# Patient Record
Sex: FEMALE | Race: WHITE | NOT HISPANIC OR LATINO | Employment: UNEMPLOYED | URBAN - METROPOLITAN AREA
[De-identification: names, ages, dates, MRNs, and addresses within clinical notes are randomized per-mention and may not be internally consistent; named-entity substitution may affect disease eponyms.]

---

## 2017-08-23 ENCOUNTER — HOSPITAL ENCOUNTER (OUTPATIENT)
Dept: LAB | Facility: HOSPITAL | Age: 47
Setting detail: SPECIMEN
Discharge: HOME | End: 2017-08-23
Attending: INTERNAL MEDICINE | Admitting: INTERNAL MEDICINE

## 2017-08-23 LAB
T4 FREE SERPL-MCNC: 1.25 NG/DL (ref 0.58–1.64)
THYROGLOB AB SERPL-ACNC: <1 [IU]/ML (ref 0–4)
TSH SERPL-ACNC: 0.72 UIU/ML (ref 0.34–5.6)

## 2017-08-30 ENCOUNTER — CONVERSION ENCOUNTER (OUTPATIENT)
Dept: ENDOCRINOLOGY | Facility: CLINIC | Age: 47
End: 2017-08-30

## 2018-08-22 ENCOUNTER — HOSPITAL ENCOUNTER (OUTPATIENT)
Dept: LAB | Facility: HOSPITAL | Age: 48
Setting detail: SPECIMEN
Discharge: HOME | End: 2018-08-22
Attending: INTERNAL MEDICINE | Admitting: INTERNAL MEDICINE

## 2018-08-29 ENCOUNTER — CONVERSION ENCOUNTER (OUTPATIENT)
Dept: ENDOCRINOLOGY | Facility: CLINIC | Age: 48
End: 2018-08-29

## 2018-11-27 ENCOUNTER — HOSPITAL ENCOUNTER (OUTPATIENT)
Dept: LAB | Facility: HOSPITAL | Age: 48
Setting detail: SPECIMEN
Discharge: HOME | End: 2018-11-27
Attending: INTERNAL MEDICINE | Admitting: INTERNAL MEDICINE

## 2019-06-04 VITALS
BODY MASS INDEX: 21.44 KG/M2 | DIASTOLIC BLOOD PRESSURE: 68 MMHG | WEIGHT: 121 LBS | HEIGHT: 63 IN | SYSTOLIC BLOOD PRESSURE: 120 MMHG | WEIGHT: 121 LBS | DIASTOLIC BLOOD PRESSURE: 82 MMHG | OXYGEN SATURATION: 100 % | HEART RATE: 69 BPM | SYSTOLIC BLOOD PRESSURE: 104 MMHG | HEART RATE: 75 BPM | OXYGEN SATURATION: 99 %

## 2019-08-23 DIAGNOSIS — E89.0 POSTOPERATIVE HYPOTHYROIDISM: ICD-10-CM

## 2019-08-23 DIAGNOSIS — C73 CARCINOMA OF THYROID GLAND (HCC): Primary | ICD-10-CM

## 2019-08-27 ENCOUNTER — LAB (OUTPATIENT)
Dept: LAB | Facility: HOSPITAL | Age: 49
End: 2019-08-27

## 2019-08-27 DIAGNOSIS — E89.0 POSTOPERATIVE HYPOTHYROIDISM: ICD-10-CM

## 2019-08-27 DIAGNOSIS — C73 CARCINOMA OF THYROID GLAND (HCC): ICD-10-CM

## 2019-08-27 LAB
T4 FREE SERPL-MCNC: 1.82 NG/DL (ref 0.58–1.64)
TSH SERPL DL<=0.05 MIU/L-ACNC: 0.03 MIU/ML (ref 0.34–5.6)

## 2019-08-27 PROCEDURE — 84432 ASSAY OF THYROGLOBULIN: CPT | Performed by: INTERNAL MEDICINE

## 2019-08-27 PROCEDURE — 84443 ASSAY THYROID STIM HORMONE: CPT | Performed by: INTERNAL MEDICINE

## 2019-08-27 PROCEDURE — 36415 COLL VENOUS BLD VENIPUNCTURE: CPT

## 2019-08-27 PROCEDURE — 84439 ASSAY OF FREE THYROXINE: CPT | Performed by: INTERNAL MEDICINE

## 2019-08-30 LAB
THYROGLOB AB SERPL-ACNC: <1 IU/ML (ref 1–4)
THYROGLOB AG TISS QL IMSTN: 14 NG/ML (ref 0–33)

## 2019-09-03 ENCOUNTER — OFFICE VISIT (OUTPATIENT)
Dept: ENDOCRINOLOGY | Facility: CLINIC | Age: 49
End: 2019-09-03

## 2019-09-03 VITALS
WEIGHT: 132 LBS | BODY MASS INDEX: 23.39 KG/M2 | DIASTOLIC BLOOD PRESSURE: 100 MMHG | OXYGEN SATURATION: 98 % | HEIGHT: 63 IN | SYSTOLIC BLOOD PRESSURE: 158 MMHG | HEART RATE: 78 BPM

## 2019-09-03 DIAGNOSIS — E89.0 POSTOPERATIVE HYPOTHYROIDISM: Primary | ICD-10-CM

## 2019-09-03 PROCEDURE — 99212 OFFICE O/P EST SF 10 MIN: CPT | Performed by: INTERNAL MEDICINE

## 2019-09-03 RX ORDER — ASPIRIN 325 MG
TABLET ORAL DAILY
COMMUNITY
Start: 2015-08-19

## 2019-09-03 RX ORDER — LEVOTHYROXINE SODIUM 0.12 MG/1
125 TABLET ORAL DAILY
Refills: 0 | COMMUNITY
Start: 2019-08-08 | End: 2019-10-02 | Stop reason: SDUPTHER

## 2019-09-03 RX ORDER — PANTOPRAZOLE SODIUM 40 MG/1
TABLET, DELAYED RELEASE ORAL
COMMUNITY
Start: 2019-07-18

## 2019-09-03 NOTE — PATIENT INSTRUCTIONS
Decrease salt intake.  Increase exercise.  Decrease levothyroxine to one daily.  Recheck BP once a week. Goal 130/80 or lower.  F/u in 1y with labs prior.

## 2019-09-09 NOTE — PROGRESS NOTES
[facility]        Patient Care Team:  Provider, Flor Known as PCP - General    Chief Complaint:    Chief Complaint   Patient presents with   • Thyroid Problem         Subjective     Here for thyroid f/u  Taking levothyroxine one tab 6d/ week, 2 tabs one day/ week as directed last visit  Exercise program: not much  Does not limit salt intake    Interval History:     Patient Complaints: stressed with family health issues  Patient Denies:  Palpitations   History taken from: patient    Review of Systems:   Review of Systems   Eyes: Negative for blurred vision.   Gastrointestinal: Negative for nausea.   Endocrine: Negative for polyuria.   Neurological: Negative for headache.     Gained 12 lb since last visit  Objective     Vital Signs      Vitals:    09/03/19 0806   BP: 158/100   Pulse: 78   SpO2: 98%         Physical Exam:     General Appearance:    Alert, cooperative, in no acute distress, obese   Head:    Normocephalic, without obvious abnormality, atraumatic   Eyes:       Mouth:  Neck:       No periorbital edema. No lid lag    No lesions    Scar from thyroidectomy, no masses or nodules   Lungs:     Clear / decreased breath sounds    Heart:    Regular rhythm and normal rate   Abdomen:     Normal bowel sounds, soft                 Extremities:   Moves all extremities well, no edema. Fine hand tremors               Pulses:   Pulses palpable and equal bilaterally   Skin:   No rash   Neurologic:  DTR 1+          Results Review:    I have reviewed the patient's new clinical results, labs & imaging.    Medication Review:   Prior to Admission medications    Medication Sig Start Date End Date Taking? Authorizing Provider   Calcium Carb-Cholecalciferol (CALCIUM-VITAMIN D3) 600-500 MG-UNIT capsule Take one daily 5/20/15  Yes Jaimie Joel MD   levothyroxine (SYNTHROID, LEVOTHROID) 125 MCG tablet Take 125 mcg by mouth Daily. Take one daily 8/8/19  Yes Jaimie Joel MD   Multiple Vitamins-Iron  (ONE DAILY MULTIVITAMIN/IRON) tablet ONE DAILY MULTIVITAMIN/IRON TABS 8/19/15  Yes Provider, MD Jaimie   pantoprazole (PROTONIX) 40 MG EC tablet Take one daily 7/18/19  Yes Provider, MD Jaimie       Lab Results (most recent)     None            No results found for: HGBA1C   No results found for: GLUCOSE, BUN, CREATININE, EGFRIFNONA, EGFRIFAFRI, BCR, K, CO2, CALCIUM, PROTENTOTREF, ALBUMIN, LABIL2, AST, ALT, CHOL, LDL, HDL, TRIG  Lab Results   Component Value Date    TSH 0.030 (L) 08/27/2019    FREET4 1.82 (H) 08/27/2019     Thyroglobulin 14    Assessment/Plan     Ellie was seen today for thyroid problem.    Diagnoses and all orders for this visit:    Postoperative hypothyroidism  -     TSH; Future  -     T4, Free; Future        Decrease salt intake.  Increase exercise.  Decrease levothyroxine to one tab daily.  Recheck BP once a week. Goal 130/80 or lower.        Fang Acevedo MD  09/08/19  10:08 PM

## 2019-10-02 RX ORDER — LEVOTHYROXINE SODIUM 0.12 MG/1
TABLET ORAL
Qty: 90 TABLET | Refills: 6 | Status: SHIPPED | OUTPATIENT
Start: 2019-10-02 | End: 2020-09-15

## 2020-09-04 DIAGNOSIS — E89.0 POSTOPERATIVE HYPOTHYROIDISM: Primary | ICD-10-CM

## 2020-09-08 ENCOUNTER — LAB (OUTPATIENT)
Dept: LAB | Facility: HOSPITAL | Age: 50
End: 2020-09-08

## 2020-09-08 DIAGNOSIS — E89.0 POSTOPERATIVE HYPOTHYROIDISM: ICD-10-CM

## 2020-09-08 LAB
T4 FREE SERPL-MCNC: 1.92 NG/DL (ref 0.93–1.7)
TSH SERPL DL<=0.05 MIU/L-ACNC: 0.15 UIU/ML (ref 0.27–4.2)

## 2020-09-08 PROCEDURE — 36415 COLL VENOUS BLD VENIPUNCTURE: CPT

## 2020-09-08 PROCEDURE — 84443 ASSAY THYROID STIM HORMONE: CPT

## 2020-09-08 PROCEDURE — 84439 ASSAY OF FREE THYROXINE: CPT

## 2020-09-15 ENCOUNTER — TELEMEDICINE (OUTPATIENT)
Dept: ENDOCRINOLOGY | Facility: CLINIC | Age: 50
End: 2020-09-15

## 2020-09-15 VITALS
HEART RATE: 77 BPM | WEIGHT: 130.6 LBS | SYSTOLIC BLOOD PRESSURE: 119 MMHG | TEMPERATURE: 98.9 F | HEIGHT: 63 IN | BODY MASS INDEX: 23.14 KG/M2 | DIASTOLIC BLOOD PRESSURE: 69 MMHG

## 2020-09-15 DIAGNOSIS — E89.0 POSTOPERATIVE HYPOTHYROIDISM: Primary | ICD-10-CM

## 2020-09-15 DIAGNOSIS — C73 CARCINOMA OF THYROID GLAND (HCC): ICD-10-CM

## 2020-09-15 PROCEDURE — 99213 OFFICE O/P EST LOW 20 MIN: CPT | Performed by: INTERNAL MEDICINE

## 2020-09-15 RX ORDER — AMLODIPINE BESYLATE 5 MG/1
5 TABLET ORAL DAILY
COMMUNITY

## 2020-09-15 RX ORDER — LEVOTHYROXINE SODIUM 0.12 MG/1
TABLET ORAL
Qty: 90 TABLET | Refills: 3 | Status: SHIPPED | OUTPATIENT
Start: 2020-09-15 | End: 2021-09-11 | Stop reason: SDUPTHER

## 2020-09-15 NOTE — PATIENT INSTRUCTIONS
Continue exercise.  Decrease levothyroxine to one tab 6 d / week.  F/u in 1y, with labs & thyroid U/S prior.

## 2020-09-26 NOTE — PROGRESS NOTES
[facility]        Patient Care Team:  ProviderFlor Known as PCP - General    Chief Complaint:    Chief Complaint   Patient presents with   • Hypothyroidism         Subjective     Here for thyroid f/u  This pt consented to this telemedicine visit in view of the covid 19 pandemic  Taking levothyroxine regularly  Exercise program: elliptical machine 30 min daily, walking    Interval History:     Patient Complaints: none  Patient Denies:  Palpitations   History taken from: patient    Review of Systems:   Review of Systems   HENT: Negative for trouble swallowing.    Eyes: Negative for blurred vision.   Respiratory: Negative for shortness of breath.    Cardiovascular: Negative for palpitations.   Gastrointestinal: Negative for nausea.   Endocrine: Negative for polyuria.   Neurological: Negative for tremors and headache.     Gained 2 lb since last visit  Objective     Vital Signs      Vitals:    09/15/20 0824   BP: 119/69   Pulse: 77   Temp: 98.9 °F (37.2 °C)         Physical Exam: limited due to video visit     General Appearance:    Alert, cooperative, in no acute distress, obese   Head:    Normocephalic, no periorbital edema or lid lag        Results Review:    I have reviewed the patient's new clinical results, labs & imaging.    Medication Review:   Prior to Admission medications    Medication Sig Start Date End Date Taking? Authorizing Provider   amLODIPine (Norvasc) 5 MG tablet Take 5 mg by mouth Daily.   Yes Jaimie Joel MD   Calcium Carb-Cholecalciferol (CALCIUM-VITAMIN D3) 600-500 MG-UNIT capsule Take one daily 5/20/15  Yes Jaimie Joel MD   levothyroxine (SYNTHROID, LEVOTHROID) 125 MCG tablet Take one tab 6 d / week. 9/15/20  Yes Fang Acevedo MD   Multiple Vitamins-Iron (ONE DAILY MULTIVITAMIN/IRON) tablet Daily. 8/19/15  Yes Jaimie Joel MD   pantoprazole (PROTONIX) 40 MG EC tablet Take one daily 7/18/19  Yes Jaimie Joel MD       Lab Results  (most recent)     None          Lab Results   Component Value Date    TSH 0.149 (L) 09/08/2020    FREET4 1.92 (H) 09/08/2020       Assessment/Plan     Ellie was seen today for hypothyroidism.    Diagnoses and all orders for this visit:    Postoperative hypothyroidism  -     levothyroxine (SYNTHROID, LEVOTHROID) 125 MCG tablet; Take one tab 6 d / week.  -     US Thyroid; Future  -     TSH; Future  -     T4, Free; Future  -     Comprehensive Thyroglobulin; Future    Carcinoma of thyroid gland (CMS/HCC)  -     US Thyroid; Future    Thyroid over treated. Will check thyroid cancer status next visit.    Continue exercise.  Decrease levothyroxine to one tab 6 d / week.    Spent 15 min talking to pt.    Fang Acevedo MD  09/25/20  23:29 EDT

## 2021-01-25 ENCOUNTER — TELEPHONE (OUTPATIENT)
Dept: ENDOCRINOLOGY | Facility: CLINIC | Age: 51
End: 2021-01-25

## 2021-08-30 ENCOUNTER — TELEPHONE (OUTPATIENT)
Dept: ENDOCRINOLOGY | Facility: CLINIC | Age: 51
End: 2021-08-30

## 2021-08-30 NOTE — TELEPHONE ENCOUNTER
TRIED TO CALL PATIENT REGARDING LAB BEING CLOSED FOR LAB APPT Wednesday 9/1/2021. NO ANSWER AND VM IS FULL.

## 2021-09-02 RX ORDER — PREDNISONE 20 MG/1
40 TABLET ORAL DAILY
COMMUNITY
Start: 2021-07-28 | End: 2021-09-10

## 2021-09-08 ENCOUNTER — TELEMEDICINE (OUTPATIENT)
Dept: ENDOCRINOLOGY | Facility: CLINIC | Age: 51
End: 2021-09-08

## 2021-09-08 VITALS — HEIGHT: 63 IN | BODY MASS INDEX: 22.15 KG/M2 | WEIGHT: 125 LBS

## 2021-09-08 DIAGNOSIS — C73 CARCINOMA OF THYROID GLAND (HCC): ICD-10-CM

## 2021-09-08 DIAGNOSIS — E89.0 POSTOPERATIVE HYPOTHYROIDISM: Primary | ICD-10-CM

## 2021-09-08 PROCEDURE — 99214 OFFICE O/P EST MOD 30 MIN: CPT | Performed by: INTERNAL MEDICINE

## 2021-09-08 NOTE — PATIENT INSTRUCTIONS
Will call you with the lab results.  Continue exercise.  Continue levothyroxine.  F/u in 1y, with labs prior.

## 2021-09-11 DIAGNOSIS — C73 CARCINOMA OF THYROID GLAND (HCC): Primary | ICD-10-CM

## 2021-09-11 DIAGNOSIS — E89.0 POSTOPERATIVE HYPOTHYROIDISM: ICD-10-CM

## 2021-09-11 RX ORDER — LEVOTHYROXINE SODIUM 0.12 MG/1
TABLET ORAL
Qty: 90 TABLET | Refills: 3 | Status: SHIPPED | OUTPATIENT
Start: 2021-09-11 | End: 2021-12-13 | Stop reason: SDUPTHER

## 2021-09-11 NOTE — PROGRESS NOTES
[facility]        Patient Care Team:  Flor Joel Known as PCP - General    Chief Complaint:    Chief Complaint   Patient presents with   • Hypothyroidism     follow up         Subjective     Here for thyroid f/u  This pt consented to this telemedicine visit in view of the covid 19 pandemic  Taking levothyroxine one tab 6d/week as directed  Exercise program: walking, elliptical machine    Interval History:     Patient Complaints: none  Patient Denies:  Palpitations  History taken from: patient    Review of Systems:   Review of Systems   Eyes: Negative for blurred vision.   Gastrointestinal: Negative for nausea.   Endocrine: Negative for polyuria.   Neurological: Negative for headache.     Lost  5 lb since last visit  Objective     Vital Signs    There were no vitals filed for this visit.      Physical Exam: limited due to video visit     General Appearance:    Alert, cooperative, in no acute distress, obese   Head:    Normocephalic, no periorbital edema        Results Review:    I have reviewed the patient's new clinical results, labs & imaging.    Medication Review:   Prior to Admission medications    Medication Sig Start Date End Date Taking? Authorizing Provider   amLODIPine (Norvasc) 5 MG tablet Take 5 mg by mouth Daily.   Yes Jaimie Joel MD   Calcium Carb-Cholecalciferol (CALCIUM-VITAMIN D3) 600-500 MG-UNIT capsule Take one daily 5/20/15  Yes Jaimie Joel MD   levothyroxine (SYNTHROID, LEVOTHROID) 125 MCG tablet Take one tab 6 d / week. 9/15/20  Yes Fang Acevedo MD   Multiple Vitamins-Iron (ONE DAILY MULTIVITAMIN/IRON) tablet Daily. 8/19/15  Yes Jaimie Joel MD   pantoprazole (PROTONIX) 40 MG EC tablet Take one daily 7/18/19  Yes Jaimie Joel MD   predniSONE (DELTASONE) 20 MG tablet Take 40 mg by mouth Daily. 7/28/21   Jaimie Joel MD       Lab Results (most recent)     None        Lab Results   Component Value Date    TSH 0.149 (L)  09/08/2020    FREET4 1.92 (H) 09/08/2020     TSH 1.0; Thyroglobulin tumor marker 10.5 ng/ ml ( 1.3 - 31.8 ) - (better than in 2019, same as 2018) on 9/2/2021.    Assessment/Plan     Diagnoses and all orders for this visit:    1. Postoperative hypothyroidism (Primary)    2. Carcinoma of thyroid gland (CMS/HCC)    Thyroid levels stable. No evidence of thyroid cancer recurrence.    Continue exercise.  Continue levothyroxine.        Fang Acevedo MD  09/10/21  23:28 EDT

## 2021-12-13 DIAGNOSIS — E89.0 POSTOPERATIVE HYPOTHYROIDISM: ICD-10-CM

## 2021-12-13 RX ORDER — LEVOTHYROXINE SODIUM 0.12 MG/1
TABLET ORAL
Qty: 90 TABLET | Refills: 3 | Status: SHIPPED | OUTPATIENT
Start: 2021-12-13 | End: 2023-02-15 | Stop reason: SDUPTHER

## 2022-01-22 DIAGNOSIS — E89.0 POSTOPERATIVE HYPOTHYROIDISM: ICD-10-CM

## 2022-01-24 RX ORDER — LEVOTHYROXINE SODIUM 0.12 MG/1
TABLET ORAL
Qty: 90 TABLET | Refills: 3 | OUTPATIENT
Start: 2022-01-24

## 2022-08-30 ENCOUNTER — LAB (OUTPATIENT)
Dept: LAB | Facility: HOSPITAL | Age: 52
End: 2022-08-30

## 2022-08-30 DIAGNOSIS — E89.0 POSTOPERATIVE HYPOTHYROIDISM: ICD-10-CM

## 2022-08-30 DIAGNOSIS — C73 CARCINOMA OF THYROID GLAND: ICD-10-CM

## 2022-08-30 DIAGNOSIS — C73 CARCINOMA OF THYROID GLAND: Primary | ICD-10-CM

## 2022-08-30 LAB
T4 FREE SERPL-MCNC: 1.65 NG/DL (ref 0.93–1.7)
TSH SERPL DL<=0.05 MIU/L-ACNC: 1.56 UIU/ML (ref 0.27–4.2)

## 2022-08-30 PROCEDURE — 84432 ASSAY OF THYROGLOBULIN: CPT

## 2022-08-30 PROCEDURE — 86800 THYROGLOBULIN ANTIBODY: CPT

## 2022-08-30 PROCEDURE — 36415 COLL VENOUS BLD VENIPUNCTURE: CPT

## 2022-08-30 PROCEDURE — 84439 ASSAY OF FREE THYROXINE: CPT

## 2022-08-30 PROCEDURE — 84443 ASSAY THYROID STIM HORMONE: CPT

## 2022-09-02 RX ORDER — NEOMYCIN SULFATE, POLYMYXIN B SULFATE AND DEXAMETHASONE 3.5; 10000; 1 MG/ML; [USP'U]/ML; MG/ML
SUSPENSION/ DROPS OPHTHALMIC
COMMUNITY
Start: 2022-08-22 | End: 2022-09-06

## 2022-09-04 LAB
THYROGLOB AB SERPL-ACNC: <1 IU/ML
THYROGLOB SERPL-MCNC: 14.2 NG/ML
THYROGLOB SERPL-MCNC: NORMAL NG/ML

## 2022-09-06 ENCOUNTER — TELEMEDICINE (OUTPATIENT)
Dept: ENDOCRINOLOGY | Facility: CLINIC | Age: 52
End: 2022-09-06

## 2022-09-06 DIAGNOSIS — C73 CARCINOMA OF THYROID GLAND: ICD-10-CM

## 2022-09-06 DIAGNOSIS — E89.0 POSTOPERATIVE HYPOTHYROIDISM: Primary | ICD-10-CM

## 2022-09-06 PROCEDURE — 99214 OFFICE O/P EST MOD 30 MIN: CPT | Performed by: INTERNAL MEDICINE

## 2022-09-06 NOTE — PROGRESS NOTES
[facility]        Patient Care Team:  Provider, Flor Known as PCP - General    Chief Complaint:    Chief Complaint   Patient presents with   • Hypothyroidism         Subjective     Here for thyroid f/u  This pt consented to this telemedicine visit in view of the having to take  to the doctor  Taking levothyroxine one tab 6d/week, as directed  Exercise program: elliptical machine daily    Interval History:     Patient Complaints: stress  Patient Denies:  Palpitations  History taken from: patient    Review of Systems:   Review of Systems   HENT: Negative for trouble swallowing.    Eyes: Negative for blurred vision.   Cardiovascular: Negative for palpitations.   Gastrointestinal: Negative for nausea.   Endocrine: Negative for polyuria.   Neurological: Negative for tremors and headache.   Psychiatric/Behavioral: Positive for stress.     Lost  13 lb since last visit, intentional  Objective     Vital Signs    There were no vitals filed for this visit.      Physical Exam: limited due to video visit     General Appearance:    Alert, cooperative, in no acute distress   Head:    Normocephalic, no periorbital edema        Results Review:    I have reviewed the patient's new clinical results, labs & imaging.    Medication Review:   Prior to Admission medications    Medication Sig Start Date End Date Taking? Authorizing Provider   amLODIPine (NORVASC) 5 MG tablet Take 5 mg by mouth Daily.   Yes Jaimie Joel MD   Calcium Carb-Cholecalciferol (CALCIUM-VITAMIN D3) 600-500 MG-UNIT capsule Take one daily 5/20/15  Yes Jaimie Joel MD   levothyroxine (SYNTHROID, LEVOTHROID) 125 MCG tablet Take one tab 6 d / week. 12/13/21  Yes Fang Acevedo MD   Multiple Vitamins-Iron (ONE DAILY MULTIVITAMIN/IRON) tablet Daily. 8/19/15  Yes Jaimie Joel MD   pantoprazole (PROTONIX) 40 MG EC tablet Take one daily 7/18/19  Yes Jaimie Joel MD   neomycin-polymyxin-dexamethasone  (MAXITROL) 3.5-96600-7.1 ophthalmic suspension  8/22/22   Provider, MD Jaimie       Lab Results (most recent)     None        Lab Results   Component Value Date    TSH 1.560 08/30/2022    FREET4 1.65 08/30/2022     Thyroglobulin 14.2    Assessment & Plan     Diagnoses and all orders for this visit:    1. Postoperative hypothyroidism (Primary)  -     TSH; Future  -     T4, Free; Future    2. Carcinoma of thyroid gland (HCC)  -     Comprehensive Thyroglobulin; Future    Thyroid stable. No thyroid cancer recurrence.    Continue exercise.  Continue levothyroxine.        Fang Acevedo MD  09/06/22  16:58 EDT

## 2023-02-15 DIAGNOSIS — E89.0 POSTOPERATIVE HYPOTHYROIDISM: ICD-10-CM

## 2023-02-15 RX ORDER — LEVOTHYROXINE SODIUM 0.12 MG/1
TABLET ORAL
Qty: 90 TABLET | Refills: 3 | Status: SHIPPED | OUTPATIENT
Start: 2023-02-15

## 2023-09-12 DIAGNOSIS — E89.0 POSTOPERATIVE HYPOTHYROIDISM: Primary | ICD-10-CM

## 2023-09-12 DIAGNOSIS — C73 CARCINOMA OF THYROID GLAND: ICD-10-CM

## 2023-09-13 ENCOUNTER — LAB (OUTPATIENT)
Dept: LAB | Facility: HOSPITAL | Age: 53
End: 2023-09-13
Payer: COMMERCIAL

## 2023-09-13 DIAGNOSIS — C73 CARCINOMA OF THYROID GLAND: ICD-10-CM

## 2023-09-13 DIAGNOSIS — E89.0 POSTOPERATIVE HYPOTHYROIDISM: ICD-10-CM

## 2023-09-13 LAB
T4 FREE SERPL-MCNC: 1.43 NG/DL (ref 0.93–1.7)
TSH SERPL DL<=0.05 MIU/L-ACNC: 3.85 UIU/ML (ref 0.27–4.2)

## 2023-09-13 PROCEDURE — 86800 THYROGLOBULIN ANTIBODY: CPT

## 2023-09-13 PROCEDURE — 84432 ASSAY OF THYROGLOBULIN: CPT

## 2023-09-13 PROCEDURE — 84439 ASSAY OF FREE THYROXINE: CPT

## 2023-09-13 PROCEDURE — 36415 COLL VENOUS BLD VENIPUNCTURE: CPT

## 2023-09-13 PROCEDURE — 84443 ASSAY THYROID STIM HORMONE: CPT

## 2023-09-19 LAB
THYROGLOB AB SERPL-ACNC: <1 IU/ML
THYROGLOB SERPL-MCNC: 10.9 NG/ML
THYROGLOB SERPL-MCNC: NORMAL NG/ML

## 2023-09-20 ENCOUNTER — OFFICE VISIT (OUTPATIENT)
Dept: ENDOCRINOLOGY | Facility: CLINIC | Age: 53
End: 2023-09-20
Payer: COMMERCIAL

## 2023-09-20 VITALS
OXYGEN SATURATION: 98 % | DIASTOLIC BLOOD PRESSURE: 75 MMHG | BODY MASS INDEX: 20.91 KG/M2 | SYSTOLIC BLOOD PRESSURE: 120 MMHG | WEIGHT: 118 LBS | HEIGHT: 63 IN | HEART RATE: 84 BPM

## 2023-09-20 DIAGNOSIS — C73 CARCINOMA OF THYROID GLAND: ICD-10-CM

## 2023-09-20 DIAGNOSIS — E89.0 POSTOPERATIVE HYPOTHYROIDISM: Primary | ICD-10-CM

## 2023-09-20 PROCEDURE — 99214 OFFICE O/P EST MOD 30 MIN: CPT | Performed by: INTERNAL MEDICINE

## 2023-09-20 RX ORDER — AMOXICILLIN AND CLAVULANATE POTASSIUM 875; 125 MG/1; MG/1
TABLET, FILM COATED ORAL
COMMUNITY
Start: 2023-07-06 | End: 2023-09-20

## 2023-09-21 NOTE — PROGRESS NOTES
[facility]        Patient Care Team:  Crow Rubio DO as PCP - General (Family Medicine)    Chief Complaint:    Chief Complaint   Patient presents with    Hypothyroidism     FU / Postoperative hypothyroidism         Subjective     Here for thyroid f/u  Taking levothyroxine 6 d / wk as directed  Exercise program: walking  LMP 2010 ( had uterine ablation )  Taking calcium+D    Interval History:     Patient Complaints: L ear infection 3 wks ago  Patient Denies:  Palpitations   History taken from: patient    Review of Systems:   Review of Systems   Constitutional:  Positive for fatigue.   HENT:  Negative for trouble swallowing.    Eyes:  Negative for blurred vision.   Cardiovascular:  Negative for palpitations.   Gastrointestinal:  Negative for nausea.   Endocrine: Negative for polyuria.   Neurological:  Negative for tremors and headache.   Psychiatric/Behavioral:  Positive for sleep disturbance.    Lost  7 lb since last visit  Objective     Vital Signs     Vitals:    09/20/23 1525   BP: 120/75   Pulse: 84   SpO2: 98%         Physical Exam:     General Appearance:    Alert, cooperative, in no acute distress   Head:    Normocephalic, without obvious abnormality, atraumatic   Eyes:       Mouth:  Neck:       No periorbital edema. No lid lag    No lesions    Scar from thyroidectomy.No masses or nodules   Lungs:     Clear    Heart:    Regular rhythm and normal rate   Abdomen:     Normal bowel sounds, soft                 Extremities:   Moves all extremities well, no edema or tremors               Pulses:   Pulses palpable and equal bilaterally   Skin:   Dry   Neurologic:  DTR 1+          Results Review:    I have reviewed the patient's new clinical results, labs & imaging.    Medication Review:   Prior to Admission medications    Medication Sig Start Date End Date Taking? Authorizing Provider   Calcium Carb-Cholecalciferol (CALCIUM-VITAMIN D3) 600-500 MG-UNIT capsule Take one daily 5/20/15  Yes  Provider, MD Jaimie   levothyroxine (SYNTHROID, LEVOTHROID) 125 MCG tablet Take one tab 6 d / week. 2/15/23  Yes Fang Acevedo MD   Multiple Vitamins-Iron (ONE DAILY MULTIVITAMIN/IRON) tablet Daily. 8/19/15  Yes Jaimie Joel MD   pantoprazole (PROTONIX) 40 MG EC tablet Take one daily 7/18/19  Yes Provider, MD Jaimie       Lab Results   Component Value Date    TSH 3.850 09/13/2023    FREET4 1.43 09/13/2023     Thyroglobulin tumor marker 10.9 ng/ml    Assessment & Plan     Diagnoses and all orders for this visit:    1. Postoperative hypothyroidism (Primary)  -     T4, Free; Future  -     TSH; Future    2. Carcinoma of thyroid gland  -     Comprehensive Thyroglobulin; Future    Thyroid stable.    Continue exercise.  Continue levothyroxine & calcium+D.        Fang Acevedo MD  09/21/23  15:49 EDT

## 2024-04-08 DIAGNOSIS — E89.0 POSTOPERATIVE HYPOTHYROIDISM: ICD-10-CM

## 2024-04-08 RX ORDER — LEVOTHYROXINE SODIUM 0.12 MG/1
TABLET ORAL
Qty: 90 TABLET | Refills: 0 | Status: SHIPPED | OUTPATIENT
Start: 2024-04-08

## 2024-04-08 NOTE — TELEPHONE ENCOUNTER
Caller: Ellie Stauffer    Relationship: Self    Best call back number: [phone]    Which medication are you concerned about: LEVOTHYROXINE    Who prescribed you this medication: [facility]    What are your concerns: PATIENT WOULD LIKE THE STATUS OF THE PRESCRIPTION. PATIENT ONLY HAS ONE TABLET LEFT

## 2024-07-20 DIAGNOSIS — E89.0 POSTOPERATIVE HYPOTHYROIDISM: ICD-10-CM

## 2024-07-22 ENCOUNTER — TELEPHONE (OUTPATIENT)
Dept: ENDOCRINOLOGY | Facility: CLINIC | Age: 54
End: 2024-07-22

## 2024-07-22 RX ORDER — LEVOTHYROXINE SODIUM 0.12 MG/1
TABLET ORAL
Qty: 90 TABLET | Refills: 0 | Status: SHIPPED | OUTPATIENT
Start: 2024-07-22

## 2024-07-22 NOTE — TELEPHONE ENCOUNTER
Caller: DollychuyitaEllie    Relationship: Self    Best call back number: [phone]    Requested Prescriptions:     levothyroxine (SYNTHROID, LEVOTHROID) 125 MCG tablet     Requested Prescriptions      No prescriptions requested or ordered in this encounter        Pharmacy where request should be sent: [facility]-  [address] [place] [address]    Last office visit with prescribing clinician: 9/20/2023   Last telemedicine visit with prescribing clinician: Visit date not found   Next office visit with prescribing clinician: 9/18/2024     Additional details provided by patient: PT NEEDS A REFILL ON THIS MEDICATION.     Does the patient have less than a 3 day supply:  [x] Yes  [] No    Would you like a call back once the refill request has been completed: [x] Yes [] No    If the office needs to give you a call back, can they leave a voicemail: [x] Yes [] No    Anitha Newsome Rep   07/22/24 09:29 EDT

## 2024-09-11 ENCOUNTER — LAB (OUTPATIENT)
Dept: LAB | Facility: HOSPITAL | Age: 54
End: 2024-09-11
Payer: COMMERCIAL

## 2024-09-11 DIAGNOSIS — C73 CARCINOMA OF THYROID GLAND: ICD-10-CM

## 2024-09-11 DIAGNOSIS — E89.0 POSTOPERATIVE HYPOTHYROIDISM: ICD-10-CM

## 2024-09-11 LAB
T4 FREE SERPL-MCNC: 1.5 NG/DL (ref 0.92–1.68)
TSH SERPL DL<=0.05 MIU/L-ACNC: 0.9 UIU/ML (ref 0.27–4.2)

## 2024-09-11 PROCEDURE — 84439 ASSAY OF FREE THYROXINE: CPT

## 2024-09-11 PROCEDURE — 36415 COLL VENOUS BLD VENIPUNCTURE: CPT

## 2024-09-11 PROCEDURE — 84443 ASSAY THYROID STIM HORMONE: CPT

## 2024-09-11 PROCEDURE — 86800 THYROGLOBULIN ANTIBODY: CPT

## 2024-09-11 PROCEDURE — 84432 ASSAY OF THYROGLOBULIN: CPT

## 2024-09-18 ENCOUNTER — OFFICE VISIT (OUTPATIENT)
Dept: ENDOCRINOLOGY | Facility: CLINIC | Age: 54
End: 2024-09-18
Payer: COMMERCIAL

## 2024-09-18 VITALS
OXYGEN SATURATION: 98 % | HEIGHT: 63 IN | HEART RATE: 76 BPM | DIASTOLIC BLOOD PRESSURE: 68 MMHG | BODY MASS INDEX: 20.73 KG/M2 | WEIGHT: 117 LBS | SYSTOLIC BLOOD PRESSURE: 120 MMHG

## 2024-09-18 DIAGNOSIS — C73 CARCINOMA OF THYROID GLAND: ICD-10-CM

## 2024-09-18 DIAGNOSIS — E89.0 POSTOPERATIVE HYPOTHYROIDISM: Primary | ICD-10-CM

## 2024-09-18 PROCEDURE — 99214 OFFICE O/P EST MOD 30 MIN: CPT | Performed by: INTERNAL MEDICINE

## 2024-09-18 RX ORDER — LEVOTHYROXINE SODIUM 125 UG/1
TABLET ORAL
Qty: 90 TABLET | Refills: 3 | Status: SHIPPED | OUTPATIENT
Start: 2024-09-18

## 2024-09-18 RX ORDER — LOSARTAN POTASSIUM 50 MG/1
1 TABLET ORAL DAILY
COMMUNITY
Start: 2024-06-27

## 2024-09-26 LAB
THYROGLOB AB SERPL-ACNC: <1 IU/ML
THYROGLOB SERPL-MCNC: 9.8 NG/ML
THYROGLOB SERPL-MCNC: NORMAL NG/ML